# Patient Record
Sex: MALE | Race: WHITE | HISPANIC OR LATINO | URBAN - METROPOLITAN AREA
[De-identification: names, ages, dates, MRNs, and addresses within clinical notes are randomized per-mention and may not be internally consistent; named-entity substitution may affect disease eponyms.]

---

## 2018-06-26 ENCOUNTER — HOSPITAL ENCOUNTER (EMERGENCY)
Facility: HOSPITAL | Age: 28
Discharge: HOME | End: 2018-06-26
Attending: EMERGENCY MEDICINE

## 2018-06-26 VITALS
DIASTOLIC BLOOD PRESSURE: 80 MMHG | TEMPERATURE: 99 F | WEIGHT: 180 LBS | OXYGEN SATURATION: 99 % | HEART RATE: 63 BPM | RESPIRATION RATE: 16 BRPM | BODY MASS INDEX: 29.99 KG/M2 | SYSTOLIC BLOOD PRESSURE: 137 MMHG | HEIGHT: 65 IN

## 2018-06-26 DIAGNOSIS — R52 PAIN: ICD-10-CM

## 2018-06-26 DIAGNOSIS — M25.569 KNEE PAIN, UNSPECIFIED CHRONICITY, UNSPECIFIED LATERALITY: Primary | ICD-10-CM

## 2018-06-26 DIAGNOSIS — M70.42 PREPATELLAR BURSITIS OF LEFT KNEE: ICD-10-CM

## 2018-06-26 LAB
ALBUMIN SERPL BCP-MCNC: 4.2 G/DL
ALP SERPL-CCNC: 81 U/L
ALT SERPL W/O P-5'-P-CCNC: 24 U/L
ANION GAP SERPL CALC-SCNC: 10 MMOL/L
AST SERPL-CCNC: 20 U/L
BASOPHILS # BLD AUTO: 0.02 K/UL
BASOPHILS NFR BLD: 0.2 %
BILIRUB SERPL-MCNC: 1.1 MG/DL
BUN SERPL-MCNC: 7 MG/DL
CALCIUM SERPL-MCNC: 9.1 MG/DL
CHLORIDE SERPL-SCNC: 106 MMOL/L
CO2 SERPL-SCNC: 22 MMOL/L
CREAT SERPL-MCNC: 0.8 MG/DL
CRP SERPL-MCNC: 9.3 MG/L
DIFFERENTIAL METHOD: ABNORMAL
EOSINOPHIL # BLD AUTO: 0.3 K/UL
EOSINOPHIL NFR BLD: 2.6 %
ERYTHROCYTE [DISTWIDTH] IN BLOOD BY AUTOMATED COUNT: 12.6 %
ERYTHROCYTE [SEDIMENTATION RATE] IN BLOOD BY WESTERGREN METHOD: 3 MM/HR
EST. GFR  (AFRICAN AMERICAN): >60 ML/MIN/1.73 M^2
EST. GFR  (NON AFRICAN AMERICAN): >60 ML/MIN/1.73 M^2
GLUCOSE SERPL-MCNC: 91 MG/DL
HCT VFR BLD AUTO: 43.8 %
HGB BLD-MCNC: 15.5 G/DL
LYMPHOCYTES # BLD AUTO: 1.5 K/UL
LYMPHOCYTES NFR BLD: 14.3 %
MCH RBC QN AUTO: 30.8 PG
MCHC RBC AUTO-ENTMCNC: 35.4 G/DL
MCV RBC AUTO: 87 FL
MONOCYTES # BLD AUTO: 0.7 K/UL
MONOCYTES NFR BLD: 7.1 %
NEUTROPHILS # BLD AUTO: 7.7 K/UL
NEUTROPHILS NFR BLD: 75.6 %
PLATELET # BLD AUTO: 134 K/UL
PMV BLD AUTO: 11.1 FL
POTASSIUM SERPL-SCNC: 4.1 MMOL/L
PROT SERPL-MCNC: 7.6 G/DL
RBC # BLD AUTO: 5.03 M/UL
SODIUM SERPL-SCNC: 138 MMOL/L
WBC # BLD AUTO: 10.13 K/UL

## 2018-06-26 PROCEDURE — 86140 C-REACTIVE PROTEIN: CPT

## 2018-06-26 PROCEDURE — 99283 EMERGENCY DEPT VISIT LOW MDM: CPT | Mod: 25

## 2018-06-26 PROCEDURE — 63600175 PHARM REV CODE 636 W HCPCS: Performed by: NURSE PRACTITIONER

## 2018-06-26 PROCEDURE — 80053 COMPREHEN METABOLIC PANEL: CPT

## 2018-06-26 PROCEDURE — 85652 RBC SED RATE AUTOMATED: CPT

## 2018-06-26 PROCEDURE — 96372 THER/PROPH/DIAG INJ SC/IM: CPT | Mod: 59

## 2018-06-26 PROCEDURE — 25000003 PHARM REV CODE 250: Performed by: NURSE PRACTITIONER

## 2018-06-26 PROCEDURE — 85025 COMPLETE CBC W/AUTO DIFF WBC: CPT

## 2018-06-26 RX ORDER — KETOROLAC TROMETHAMINE 30 MG/ML
30 INJECTION, SOLUTION INTRAMUSCULAR; INTRAVENOUS
Status: COMPLETED | OUTPATIENT
Start: 2018-06-26 | End: 2018-06-26

## 2018-06-26 RX ORDER — IBUPROFEN 600 MG/1
600 TABLET ORAL
Status: COMPLETED | OUTPATIENT
Start: 2018-06-26 | End: 2018-06-26

## 2018-06-26 RX ADMIN — IBUPROFEN 600 MG: 600 TABLET ORAL at 01:06

## 2018-06-26 RX ADMIN — KETOROLAC TROMETHAMINE 30 MG: 30 INJECTION, SOLUTION INTRAMUSCULAR at 03:06

## 2018-06-26 NOTE — DISCHARGE INSTRUCTIONS
Please use crutches as needed and take tylenol or motrin as labeled as needed for pain. Follow-up with ortho within 2-3 days. Return to ED if symptoms worsen or change.

## 2018-06-26 NOTE — ED PROVIDER NOTES
Encounter Date: 6/26/2018       History     Chief Complaint   Patient presents with    Knee Pain     c/o left knee pain and swelling since last night. Denies injury     Pt is a 27-year-old male who denies pmhx who presents to ED with left knee pain since yesterday. Pt gives permission for his friend to translate. Pt denies any trauma or injury. Pt reports that he had the same problem in his other knee about a month ago but did not go to the doctor. Pt reports that he can bear weight on it but that it hurts. Pt reports that the pain is worse with bending and palpation. Pt denies taking any medications PTA. Pt denies any alleviating factors. Pt denies fever, chills, neck pain/stiffness, SOB, chest pain, nausea, and vomiting. Pt denies any other complaints at this time.       The history is provided by the patient and a friend. The history is limited by a language barrier. A  was used.     Review of patient's allergies indicates:  No Known Allergies  History reviewed. No pertinent past medical history.  History reviewed. No pertinent surgical history.  History reviewed. No pertinent family history.  Social History   Substance Use Topics    Smoking status: Never Smoker    Smokeless tobacco: Not on file    Alcohol use No     Review of Systems   Constitutional: Negative for chills and fever.   HENT: Negative for sore throat.    Respiratory: Negative for shortness of breath.    Cardiovascular: Negative for chest pain.   Gastrointestinal: Negative for nausea and vomiting.   Musculoskeletal: Positive for arthralgias and joint swelling. Negative for back pain, gait problem, myalgias, neck pain and neck stiffness.        L knee   Skin: Negative for rash.   Neurological: Negative for weakness.   Hematological: Does not bruise/bleed easily.       Physical Exam     Initial Vitals [06/26/18 1201]   BP Pulse Resp Temp SpO2   137/80 63 16 98.8 °F (37.1 °C) 99 %      MAP       --         Physical  Exam    Nursing note and vitals reviewed.  Constitutional: Vital signs are normal. He appears well-developed and well-nourished. He is not diaphoretic. He is active.  Non-toxic appearance. He does not have a sickly appearance.   HENT:   Head: Normocephalic.   Nose: Nose normal.   Mouth/Throat: Oropharynx is clear and moist.   Eyes: Lids are normal.   Neck: Trachea normal, normal range of motion, full passive range of motion without pain and phonation normal. Neck supple.   Cardiovascular: Normal rate, regular rhythm, normal heart sounds and normal pulses.   Pulses:       Dorsalis pedis pulses are 2+ on the right side, and 2+ on the left side.   Pulmonary/Chest: Effort normal and breath sounds normal.   Musculoskeletal:        Left knee: He exhibits decreased range of motion and swelling. He exhibits no effusion, no ecchymosis, no deformity, no laceration, no erythema, normal alignment, no LCL laxity, normal patellar mobility, no bony tenderness, normal meniscus and no MCL laxity. Tenderness found. No medial joint line, no lateral joint line, no MCL, no LCL and no patellar tendon tenderness noted.        Legs:  TTP to patellar with effusion. Minimal redness and swelling. Decreased ROM d/t pain. No bony tenderness or deformity. Pt NVI.   Neurological: He is alert and oriented to person, place, and time. He has normal strength. No sensory deficit. Gait normal. GCS eye subscore is 4. GCS verbal subscore is 5. GCS motor subscore is 6.   Skin: Skin is warm, dry and intact. Capillary refill takes less than 2 seconds. No rash noted.   Psychiatric: He has a normal mood and affect.         ED Course   Procedures  Labs Reviewed   CBC W/ AUTO DIFFERENTIAL - Abnormal; Notable for the following:        Result Value    Platelets 134 (*)     Gran% 75.6 (*)     Lymph% 14.3 (*)     All other components within normal limits   COMPREHENSIVE METABOLIC PANEL - Abnormal; Notable for the following:     CO2 22 (*)     Total Bilirubin 1.1  (*)     All other components within normal limits   C-REACTIVE PROTEIN - Abnormal; Notable for the following:     CRP 9.3 (*)     All other components within normal limits   SEDIMENTATION RATE              Imaging Results    None          Medical Decision Making:   Initial Assessment:   Pt presents to ED with left knee pain since yesterday. Pt appears well, non-toxic. Pt afebrile. 2+ DP pulses bilaterally by palpation. Sensation and strength intact bilaterally in lower extremities. Pt NVI.  Differential Diagnosis:   Sprain/strain, effusion, septic joint, bursitis   Clinical Tests:   Lab Tests: Ordered and Reviewed  Radiological Study: Ordered and Reviewed  ED Management:  Xray knee, 600 mg ibuprofen, CBC, CMP, CRP, sed rate, 30 mg IM torado, crutches   Xray revealed metallic body seen within the medial soft tissues, no fracture or dislocation. Bipartite patella noted.  No joint effusion.  Prepatellar soft tissue swelling suggesting prepatellar bursitis.  Cartilage spaces are maintained on nonweightbearing views.  CRP mildly elevated.  Sed rate, CBC and CMP unremarkable.  Patient will be given crutches and a shot of Toradol in the ED.  Patient is stable and will be DC home.  Patient instructed to follow up with orthopedic within 2-3 days and to return to ED if symptoms worsen or change.                                    Clinical Impression:   The primary encounter diagnosis was Knee pain, unspecified chronicity, unspecified laterality. Diagnoses of Pain and Prepatellar bursitis of left knee were also pertinent to this visit.                             Gregorio Priest NP  06/26/18 1638

## 2018-07-05 ENCOUNTER — LAB VISIT (OUTPATIENT)
Dept: LAB | Facility: HOSPITAL | Age: 28
End: 2018-07-05
Attending: INTERNAL MEDICINE

## 2018-07-05 ENCOUNTER — OFFICE VISIT (OUTPATIENT)
Dept: INTERNAL MEDICINE | Facility: CLINIC | Age: 28
End: 2018-07-05

## 2018-07-05 VITALS
WEIGHT: 158.75 LBS | OXYGEN SATURATION: 98 % | HEIGHT: 65 IN | HEART RATE: 68 BPM | SYSTOLIC BLOOD PRESSURE: 120 MMHG | DIASTOLIC BLOOD PRESSURE: 78 MMHG | BODY MASS INDEX: 26.45 KG/M2

## 2018-07-05 DIAGNOSIS — Z11.4 ENCOUNTER FOR SPECIAL SCREENING EXAMINATION FOR HIV: ICD-10-CM

## 2018-07-05 DIAGNOSIS — Z11.3 SCREEN FOR STD (SEXUALLY TRANSMITTED DISEASE): ICD-10-CM

## 2018-07-05 DIAGNOSIS — M25.562 LEFT ANTERIOR KNEE PAIN: Primary | ICD-10-CM

## 2018-07-05 DIAGNOSIS — R52 PAIN MANAGEMENT: ICD-10-CM

## 2018-07-05 PROCEDURE — 86592 SYPHILIS TEST NON-TREP QUAL: CPT

## 2018-07-05 PROCEDURE — 99999 PR PBB SHADOW E&M-EST. PATIENT-LVL IV: CPT | Mod: PBBFAC,,, | Performed by: INTERNAL MEDICINE

## 2018-07-05 PROCEDURE — 86703 HIV-1/HIV-2 1 RESULT ANTBDY: CPT

## 2018-07-05 PROCEDURE — 87491 CHLMYD TRACH DNA AMP PROBE: CPT

## 2018-07-05 PROCEDURE — 99204 OFFICE O/P NEW MOD 45 MIN: CPT | Mod: S$PBB,,, | Performed by: INTERNAL MEDICINE

## 2018-07-05 PROCEDURE — 99214 OFFICE O/P EST MOD 30 MIN: CPT | Mod: PBBFAC,PO | Performed by: INTERNAL MEDICINE

## 2018-07-05 PROCEDURE — 36415 COLL VENOUS BLD VENIPUNCTURE: CPT | Mod: PO

## 2018-07-05 NOTE — PROGRESS NOTES
Portions of this note are generated with voice recognition software. Typographical errors may exist.     SUBJECTIVE:    This is a/an 27 y.o. 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  His wife and 3 children still live in [place].  States that he got injured at the work place 3 months ago.  He was lifting a heavy object.  Lost  on the object and he had an impact on the left knee partially flexed.  States that there was not significant immediate pain. He was able to return to work functions that same day.  Patient states that gradually over the past 3 months he has had persisting effusion involving the left knee.  There is significant anterior knee pain and warmth associated with the effusion.  Pain is worse with walking short distances and placing weight on the knee.  He has difficulty walking up stairs.  No constitutional symptoms.  Patient denies having problems with the knee giving out.  Patient went to the emergency room for the evaluation of the knee because he does not have medical insurance.  X-ray from the emergency room shows foreign possible metallic body in the knee.      Self care measures have been minimal.  He has not started taking over-the-counter analgesics medicine and has not been pursuing rice measures.  He has not been evaluated by an orthopedist.      The patient had abnormal platelet and lymphocyte count at recent blood work from the emergency room. The patient states that he has never been screened for gonorrhea chlamydia HIV or syphilis.  Although he does not perceive that he is at risk for these infections he voices interest to have these done.        Medications Reviewed and Updated    Past medical, family, and social histories were reviewed and updated.    Review of Systems negative unless otherwise noted in history of present  illness-  ROS    General ROS: negative  Psychological ROS: negative  ENT ROS: negative  Endocrine ROS: Negative  Allergy and Immunology ROS: negative  Gastrointestinal ROS: negative  Genito-Urinary ROS: negative  Musculoskeletal ROS: negative  Neurological ROS: negative  Dermatological ROS: negative        Allergic:  Review of patient's allergies indicates:  No Known Allergies    OBJECTIVE:  BP: 120/78 Pulse: 68    Wt Readings from Last 3 Encounters:   07/05/18 72 kg (158 lb 11.7 oz)   06/26/18 81.6 kg (180 lb)    Body mass index is 26.41 kg/m².  Previous Blood Pressure Readings :   BP Readings from Last 3 Encounters:   07/05/18 120/78   06/26/18 137/80       Physical Exam    GEN: No apparent distress  HEENT: sclera non-icteric, conjunctiva clear  CV: no peripheral edema  PULM: breathing non-labored  ABD: Obese, protuberant abdomen.  PSYCH: appropriate affect  MSK: able to rise from chair without assistance  - significant warmth and anterior effusion over the knee.  - visible deformity over the lateral aspect of the quadriceps muscle proximal to the knee.    - no significant point tenderness over the quadriceps muscle.  - significant point tenderness over the patella left knee.    - pain with standing on left leg.  Over the patella  - negative drawer sign.      SKIN: normal skin turgor    Pertinent Labs Reviewed       ASSESSMENT/PLAN:    Left anterior knee pain.This is a New problem. The etiology is unknown. could be related to foreign body. patellar pathology possible. infectious process possible. The problem is not adequately controlled. The risk of medical complications is moderate. Treatment/diagnostic recommendations are to modify the diagnostic/treatment plan as follows in addition to instructions noted on the After Visit Summary. The patient advised if symptoms change or intensify to seek medical care.   -     Cancel: Ambulatory referral to Orthopedics  -     Ambulatory referral to Orthopedics    Pain  management.Condition not optimally controlled. Detailed counseling on self care measures. Plan to monitor clinically in addition to plan below or as listed on After Visit Summary.     Screen for STD (sexually transmitted disease)  -     C. trachomatis/N. gonorrhoeae by AMP DNA Urine  -     RPR; Future; Expected date: 07/05/2018    Encounter for special screening examination for HIV  -     HIV-1 and HIV-2 antibodies; Future; Expected date: 07/05/2018          Future Appointments  Date Time Provider Department Center   7/5/2018 10:00 AM [facility]  7/5/2018  9:45 AM

## 2018-07-05 NOTE — PATIENT INSTRUCTIONS
Recomendaciones para hoy    Debido a que los tratamientos recetados para el dolor de rodilla pueden ser costosos, recomendamos comenzar con medicamentos de venta malu con ibuprofeno y Tylenol extrafuerte.    Al usar ibuprofeno, puede siddharth hasta 3 tabletas cada 6 horas, según sea necesario, para ayudar con el dolor y la inflamación de la rodilla. Si tiene malestar estomacal después de siddharth ibuprofeno tomas varios días, debe dejar de siddharth ibuprofeno y, en mares lugar, siddharth Tylenol extrafuerte hasta que los síntomas del estómago desaparezcan. Cuando desaparecen los síntomas estomacales puede volver a siddharth ibuprofeno según sea necesario.    Recomendamos encarecidamente que busque los servicios de un médico ortopédico. Trabajaremos para conseguir esto programado. Debería recibir teresa llamada telefónica tomas los próximos 7 días para hablar sobre raz opciones de brittney a un médico ortopédico en el vecindario.    Le recomendamos que se contacte con las [facility] para recibir atención médica en el futuro. Esta oficina tiene un programa especial para ayudar al paciente a no tener seguro médico. El número de teléfono de la clínica es [phone]. La dirección es [address] [facility]    Recommendations for today    Because prescription treatments for knee pain can be expensive we recommend starting with over-the-counter medication ibuprofen and Tylenol extra-strength.    When using ibuprofen you can take up to 3 tablets every 6 hr as needed to help with knee pain and swelling. Should you have stomach upset after taking ibuprofen for several days you should stop taking ibuprofen and instead take Tylenol extra-strength until your stomach symptoms go away.  When stomach symptoms go away you can go back to taking ibuprofen as needed.    We highly recommend that you seek the services of an orthopedic doctor.  We will work to get this scheduled.  You should receive a phone call in the next 7 days  discussing your options for seeing an orthopedic doctor in the neighborhood.    We recommend that you contact the [facility] for future medical care.  This office has a special program to help patient is without medical insurance.  The phone number for the clinic is [phone].  The address is [address] [place] [address]      Cómo reducir el dolor y la hinchazón de la rodilla    Hay muchos tratamientos que pueden ayudarle a reducir el dolor y la hinchazón de la rodilla. Mares proveedor de atención médica o fisioterapeuta podría sugerirle doris o más de los tratamientos siguientes:  · Aplicar hielo a la rodilla ayuda a rebajar la hinchazón. Leonardo vez le recomienden que nathan esto teresa o más veces al día. Aplique hielo tomas 15 a 20 minutos por vez, con un intervalo de, al menos, 40 minutos entre sesión y sesión. Siempre coloque teresa toalla entre el hielo y mares piel.  · Mantener la pierna elevada por encima del nivel del corazón ayuda a expulsar el exceso de líquido de la articulación de la rodilla, lo cual reduce la hinchazón.  · La compresión implica enrollar ajustadamente teresa venda elástica o manguito de neopreno alrededor de la rodilla, para evitar que se acumule líquido en la articulación.  · La estimulación eléctrica, realizada por un fisioterapeuta o un entrenador físico, puede disminuir el exceso de líquido de la articulación de rodilla.  · Los antiinflamatorios son medicamentos que puede recetarle mares proveedor de atención médica. Es posible que tome pastillas, o que le apliquen inyecciones en la rodilla.  · Los ejercicios isométricos (de contracción) fortalecen los músculos que sostienen la articulación de la rodilla, y ayudan a eliminar el exceso de líquido acumulado.  · El masaje ayuda a expulsar el líquido de la rodilla.  Date Last Reviewed: 10/13/2015  © 2601-8207 [employer] [address] [place] [address], [place] [address]. Todos los derechos reservados. Esta información no pretende  "sustituir la atención médica profesional. Sólo mares médico puede diagnosticar y tratar un problema de silvina.        Vendaje Elástico [Ace Wrap]  Un "Vendaje As " es cualquier envoltura de venda elástica (ancho de 2-6") Se usa para darle soporte y compresión a un brazo o pierna. También ayudará a prevenir o reducir la hinchazón.   Cuando aplique el vendaje no lo apriete demasiado. Anai venda elástica muy apretada reducirá la circulación y causará el entumecimiento u hormigueo de la mano o el pie. Puede aumentar el dolor debajo del vendaje. Si usted tiene estos síntomas, quite la venda y deje descansar el miembro. Los síntomas deberían irse dentro de 1-2 horas.   Anai vez desaparecidos los síntomas, vuelva a poner el vendaje más flojo. Si los síntomas no se van después de 1-2 horas que se quitó el vendaje, llame a mares médico o regrese a esta institución de inmediato.   Date Last Reviewed: 9/13/2015  © 5752-0586 [employer] [address] [place] [id number]. Todos los derechos reservados. Esta información no pretende sustituir la atención médica profesional. Sólo mares médico puede diagnosticar y tratar un problema de silvina.        "

## 2018-07-06 LAB
C TRACH DNA SPEC QL NAA+PROBE: NOT DETECTED
HIV 1+2 AB+HIV1 P24 AG SERPL QL IA: NEGATIVE
N GONORRHOEA DNA SPEC QL NAA+PROBE: NOT DETECTED
RPR SER QL: NORMAL